# Patient Record
Sex: FEMALE | Race: BLACK OR AFRICAN AMERICAN | NOT HISPANIC OR LATINO | Employment: UNEMPLOYED | URBAN - METROPOLITAN AREA
[De-identification: names, ages, dates, MRNs, and addresses within clinical notes are randomized per-mention and may not be internally consistent; named-entity substitution may affect disease eponyms.]

---

## 2023-07-27 PROBLEM — R59.9 ENLARGED LYMPH NODE: Status: ACTIVE | Noted: 2023-07-27

## 2023-07-27 PROBLEM — H54.7 BLIND: Status: ACTIVE | Noted: 2023-07-27

## 2023-07-27 PROBLEM — R21 RASH: Status: ACTIVE | Noted: 2023-07-27

## 2023-07-27 PROBLEM — R62.50 DEVELOPMENTAL DELAY: Status: ACTIVE | Noted: 2023-07-27

## 2023-07-27 PROBLEM — J30.9 ALLERGIC RHINITIS: Status: ACTIVE | Noted: 2023-07-27

## 2023-07-27 PROBLEM — M54.9 BACK PAIN: Status: ACTIVE | Noted: 2023-07-27

## 2023-07-27 PROBLEM — H90.3 ASYMMETRICAL SENSORINEURAL HEARING LOSS: Status: ACTIVE | Noted: 2023-07-27

## 2023-08-18 ENCOUNTER — APPOINTMENT (OUTPATIENT)
Dept: PRIMARY CARE | Facility: CLINIC | Age: 38
End: 2023-08-18
Payer: MEDICAID

## 2023-12-15 ENCOUNTER — OFFICE VISIT (OUTPATIENT)
Dept: PRIMARY CARE | Facility: CLINIC | Age: 38
End: 2023-12-15
Payer: MEDICAID

## 2023-12-15 VITALS
WEIGHT: 116 LBS | BODY MASS INDEX: 33.97 KG/M2 | OXYGEN SATURATION: 99 % | RESPIRATION RATE: 16 BRPM | HEART RATE: 76 BPM | TEMPERATURE: 97.9 F | SYSTOLIC BLOOD PRESSURE: 128 MMHG | DIASTOLIC BLOOD PRESSURE: 70 MMHG

## 2023-12-15 DIAGNOSIS — N64.4 BREAST PAIN: Primary | ICD-10-CM

## 2023-12-15 DIAGNOSIS — K21.9 GASTROESOPHAGEAL REFLUX DISEASE WITHOUT ESOPHAGITIS: ICD-10-CM

## 2023-12-15 PROCEDURE — 99214 OFFICE O/P EST MOD 30 MIN: CPT | Performed by: INTERNAL MEDICINE

## 2023-12-15 RX ORDER — FLUTICASONE PROPIONATE 50 MCG
1 SPRAY, SUSPENSION (ML) NASAL DAILY
COMMUNITY
Start: 2019-10-24

## 2023-12-15 RX ORDER — PANTOPRAZOLE SODIUM 40 MG/1
40 TABLET, DELAYED RELEASE ORAL DAILY
Qty: 30 TABLET | Refills: 1 | Status: SHIPPED | OUTPATIENT
Start: 2023-12-15 | End: 2024-12-14

## 2023-12-15 ASSESSMENT — ENCOUNTER SYMPTOMS
BREAST PAIN: 1
ROS GI COMMENTS: POSITIVE FOR HEARTBURN.

## 2023-12-15 NOTE — PROGRESS NOTES
Patient here for indigestion issues. Discuss breast pain     Subjective   Patient ID: Linda Sánchez is a 38 y.o. female with Developmental Delay who presents for GI Problem and Breast Pain.  She is accompanied by her mother, who offers most of the history.    The patient reports onset of right-sided breast pain.  Her mother denies any family history of breast disease.     The patient notes occasional choking on bread, and this has improved since removing this food from her diet. This is associated with occasional heartburn.    GI Problem    Breast Pain  Associated Symptoms: breast pain      Review of Systems   Gastrointestinal:         Positive for heartburn.     Objective   Physical Exam  Constitutional:       Appearance: Normal appearance.   Neck:      Vascular: No carotid bruit.   Cardiovascular:      Rate and Rhythm: Normal rate and regular rhythm.      Heart sounds: Normal heart sounds.   Pulmonary:      Effort: Pulmonary effort is normal.      Breath sounds: Normal breath sounds.   Abdominal:      General: Bowel sounds are normal.      Palpations: Abdomen is soft.      Tenderness: There is abdominal tenderness. There is no guarding or rebound.   Skin:     General: Skin is warm and dry.   Neurological:      General: No focal deficit present.      Mental Status: She is alert and oriented to person, place, and time. Mental status is at baseline.   Psychiatric:         Mood and Affect: Mood normal.         Behavior: Behavior normal.       Assessment/Plan   Problem List Items Addressed This Visit    None  Visit Diagnoses         Codes    Breast pain    -  Primary N64.4    Relevant Orders    BI US breast complete bilateral    BI mammo bilateral diagnostic    Gastroesophageal reflux disease without esophagitis     K21.9    Relevant Medications    pantoprazole (ProtoNix) 40 mg EC tablet            IMPRESSION/PLAN:     Right-Sided Breast Pain  - Ordered Bilateral U/S breast, and Bilateral Diagnostic Mammogram.       Heartburn  - Prescribed pantoprazole 40mg once daily in the morning before breakfast.  Call the clinic if symptoms persist or worsen.    Woman's Wellness  - Encouraged patient to schedule follow-up with  from Obstetrics/Gynecology, and her mother agreed.    /70 in the office today.    Seasonal Allergies   - Refilled Fexofenadine 180 mg daily. She can continue with Flonase 2 sprays in each nostril once a day. Mother will call if symptoms worsen.      Health Maintenance   - Routine labs 2/2023.     Follow-up according to routine health maintenance, call sooner if needed.        Scribe Attestation  By signing my name below, I, Marilyn Amado   attest that this documentation has been prepared under the direction and in the presence of Rick Calvert DO.   Erum Rice 12/15/23 11:09 AM

## 2024-01-23 ENCOUNTER — HOSPITAL ENCOUNTER (OUTPATIENT)
Dept: RADIOLOGY | Facility: CLINIC | Age: 39
Discharge: HOME | End: 2024-01-23
Payer: MEDICAID

## 2024-01-23 ENCOUNTER — ANCILLARY PROCEDURE (OUTPATIENT)
Dept: RADIOLOGY | Facility: CLINIC | Age: 39
End: 2024-01-23
Payer: MEDICAID

## 2024-01-23 VITALS — HEIGHT: 55 IN | BODY MASS INDEX: 26.84 KG/M2 | WEIGHT: 115.96 LBS

## 2024-01-23 DIAGNOSIS — N64.4 BREAST PAIN: ICD-10-CM

## 2024-01-23 PROCEDURE — 76981 USE PARENCHYMA: CPT | Mod: RT

## 2024-01-23 PROCEDURE — 76642 ULTRASOUND BREAST LIMITED: CPT | Performed by: RADIOLOGY

## 2024-01-23 PROCEDURE — 76642 ULTRASOUND BREAST LIMITED: CPT | Mod: RT,59

## 2024-01-23 PROCEDURE — 77066 DX MAMMO INCL CAD BI: CPT | Performed by: RADIOLOGY

## 2024-01-23 PROCEDURE — 77066 DX MAMMO INCL CAD BI: CPT

## 2024-12-30 ENCOUNTER — OFFICE VISIT (OUTPATIENT)
Dept: URGENT CARE | Age: 39
End: 2024-12-30
Payer: MEDICAID

## 2024-12-30 VITALS
DIASTOLIC BLOOD PRESSURE: 87 MMHG | HEART RATE: 78 BPM | SYSTOLIC BLOOD PRESSURE: 129 MMHG | OXYGEN SATURATION: 99 % | TEMPERATURE: 98.1 F | RESPIRATION RATE: 16 BRPM

## 2024-12-30 DIAGNOSIS — J06.9 URI, ACUTE: Primary | ICD-10-CM

## 2024-12-30 PROCEDURE — 1036F TOBACCO NON-USER: CPT | Performed by: PHYSICIAN ASSISTANT

## 2024-12-30 PROCEDURE — 99203 OFFICE O/P NEW LOW 30 MIN: CPT | Performed by: PHYSICIAN ASSISTANT

## 2024-12-30 RX ORDER — BENZONATATE 200 MG/1
200 CAPSULE ORAL 3 TIMES DAILY PRN
Qty: 21 CAPSULE | Refills: 0 | Status: SHIPPED | OUTPATIENT
Start: 2024-12-30 | End: 2025-01-06

## 2024-12-30 RX ORDER — AZITHROMYCIN 250 MG/1
TABLET, FILM COATED ORAL
Qty: 6 TABLET | Refills: 0 | Status: SHIPPED | OUTPATIENT
Start: 2024-12-30

## 2024-12-30 NOTE — PATIENT INSTRUCTIONS
Warm liquids with honey  Increase fluid intake; encourage electrolytes such as Pedialyte  10 deep breaths an hour  May use tylenol/NSAIDs as needed for fevers, chills, body aches.   Monitor symptoms over the next 3-5 days if not improving or worsening start antibiotic

## 2024-12-30 NOTE — PROGRESS NOTES
Subjective   Patient ID: Linda Sánchez is a 39 y.o. female. They present today with a chief complaint of uri  History of Present Illness  HPI  39-year-old patient  with history of developmental delay presents to clinic accompanied by patient's mother and caretaker with complaints of cough with associated rhinorrhea, nasal congestion, intermittent shortness of breath, sore throat ongoing for the past 6 to 7 days.  Reports no treatments tried. Denies chest pain, dizziness, fevers, chills, body aches, nausea, vomiting, abdominal pain, diarrhea.     Past Medical History  Allergies as of 12/30/2024 - Reviewed 12/15/2023   Allergen Reaction Noted    Penicillins Other and Swelling 09/14/2018    Animal dander Other 12/15/2023    House dust Other 12/15/2023       (Not in a hospital admission)       Past Medical History:   Diagnosis Date    Other conditions influencing health status     No significant past medical history    Personal history of other diseases of the digestive system     History of gastroesophageal reflux (GERD)    Unspecified hearing loss, left ear     Hearing loss in left ear    Unspecified lack of expected normal physiological development in childhood     Developmental delay       Past Surgical History:   Procedure Laterality Date    OTHER SURGICAL HISTORY  03/12/2019    Foot surgery    OTHER SURGICAL HISTORY  11/15/2018    Blepharoplasty            Review of Systems  Review of Systems  ROS negative with the exception as noted on HPI                              Objective    There were no vitals filed for this visit.  No LMP recorded. Patient is postmenopausal.    Physical Exam  Constitutional:       Appearance: Normal appearance.   HENT:      Head: Normocephalic and atraumatic.      Right Ear: Ear canal and external ear normal. A middle ear effusion is present.      Left Ear: Ear canal and external ear normal. A middle ear effusion is present. There is impacted cerumen.      Nose: Mucosal edema (and  erythema) present. No rhinorrhea.      Right Sinus: No maxillary sinus tenderness or frontal sinus tenderness.      Left Sinus: No maxillary sinus tenderness or frontal sinus tenderness.      Mouth/Throat:      Lips: Pink.      Mouth: Mucous membranes are moist. No oral lesions.      Dentition: Normal dentition. No gingival swelling.      Tongue: No lesions. Tongue does not deviate from midline.      Palate: No mass and lesions.      Pharynx: Posterior oropharyngeal erythema and postnasal drip present. No pharyngeal swelling or uvula swelling.   Cardiovascular:      Rate and Rhythm: Normal rate and regular rhythm.      Heart sounds: No murmur heard.  Pulmonary:      Effort: Pulmonary effort is normal. No respiratory distress.      Breath sounds: Normal breath sounds. No stridor. No wheezing, rhonchi or rales.   Lymphadenopathy:      Cervical: Cervical adenopathy present.   Neurological:      Mental Status: She is alert.         Procedures    Point of Care Test & Imaging Results from this visit  No results found for this visit on 12/30/24.   No results found.    Diagnostic study results (if any) were reviewed by Kandi Padilla PA-C.    Assessment/Plan   Allergies, medications, history, and pertinent labs/EKGs/Imaging reviewed by Kandi Padilla PA-C.   cough with associated rhinorrhea, nasal congestion, intermittent shortness of breath, sore throat ongoing for the past 6 to 7 days.   Mother who is also being evaluated in clinic tested positive for COVID-19.  Discussed with parent and patient likely patient has COVID-19 as well but at this time no longer treatable.  Will start Tessalon Perles for symptomatic relief.  Z-Earle ordered in the event patient develops worsening symptoms or does not improve in the next 3 to 5 days may start oral antibiotic for potential secondary bacterial infection.  Antibiotic stewardship and watchful waiting started. Advised to drink plenty of fluids, run a cool-mist humidifier  in room at night, and get plenty of rest. Pt. is advised to take 10 deep breaths and hours and continue to walk around every couple of hours. Patient should avoid over-exertion and reduce exposure to irritants such as smoke, cold, dry air, and dust. Patient may take acetaminophen or ibuprofen as directed to reduce fever and body aches. Risk, benefits, and potential side effects of medication(s) discussed with pt. And parent. Discussed disease/illness presentation, treatment options, progression, complications, and outcomes with patient. Pt. And parent Have expressed understanding and are in agreement of plan of care.       Medical Decision Making      Orders and Diagnoses  There are no diagnoses linked to this encounter.    Medical Admin Record      Patient disposition: Home    Electronically signed by Kandi Padilla PA-C  5:09 PM

## 2025-01-16 ENCOUNTER — APPOINTMENT (OUTPATIENT)
Dept: PRIMARY CARE | Facility: CLINIC | Age: 40
End: 2025-01-16
Payer: MEDICAID

## 2025-01-16 ENCOUNTER — LAB (OUTPATIENT)
Dept: LAB | Facility: LAB | Age: 40
End: 2025-01-16
Payer: MEDICAID

## 2025-01-16 VITALS
RESPIRATION RATE: 16 BRPM | WEIGHT: 116 LBS | BODY MASS INDEX: 33.95 KG/M2 | TEMPERATURE: 97.8 F | OXYGEN SATURATION: 97 % | DIASTOLIC BLOOD PRESSURE: 70 MMHG | SYSTOLIC BLOOD PRESSURE: 122 MMHG | HEART RATE: 85 BPM

## 2025-01-16 DIAGNOSIS — H61.20 IMPACTED CERUMEN, UNSPECIFIED LATERALITY: Primary | ICD-10-CM

## 2025-01-16 DIAGNOSIS — Z00.00 HEALTHCARE MAINTENANCE: ICD-10-CM

## 2025-01-16 PROCEDURE — 80053 COMPREHEN METABOLIC PANEL: CPT

## 2025-01-16 PROCEDURE — 85027 COMPLETE CBC AUTOMATED: CPT

## 2025-01-16 PROCEDURE — 85007 BL SMEAR W/DIFF WBC COUNT: CPT

## 2025-01-16 PROCEDURE — 84443 ASSAY THYROID STIM HORMONE: CPT

## 2025-01-16 PROCEDURE — 99214 OFFICE O/P EST MOD 30 MIN: CPT | Performed by: INTERNAL MEDICINE

## 2025-01-16 PROCEDURE — 1036F TOBACCO NON-USER: CPT | Performed by: INTERNAL MEDICINE

## 2025-01-16 ASSESSMENT — ENCOUNTER SYMPTOMS
ABDOMINAL PAIN: 0
CONSTIPATION: 0
DIARRHEA: 0
COUGH: 0

## 2025-01-16 ASSESSMENT — PATIENT HEALTH QUESTIONNAIRE - PHQ9
1. LITTLE INTEREST OR PLEASURE IN DOING THINGS: NOT AT ALL
SUM OF ALL RESPONSES TO PHQ9 QUESTIONS 1 AND 2: 0
2. FEELING DOWN, DEPRESSED OR HOPELESS: NOT AT ALL

## 2025-01-16 NOTE — PROGRESS NOTES
Patient here for follow up from COVID     Subjective   Patient ID: Linda Sánchez is a 39 y.o. female who presents for Follow-up.  She is accompanied by her mother, who offers some of the history.    The patient has been eating food quickly without chewing properly, resulting in indigestion.  Her mother suspects that this may be in an effort to get to work on time.  The patient denies any abdominal pain or bowel problems.    The patient is recovering from a recent episode of suspected Covid-19.  She states that the cough has resolved.     The patient mentions ear fullness, and her mother requests a referral to Otolaryngology for ear irrigation.      Review of Systems   HENT:          Positive for ear fullness.   Respiratory:  Negative for cough.    Gastrointestinal:  Negative for abdominal pain, constipation and diarrhea.     Objective   Physical Exam  Constitutional:       Appearance: Normal appearance.   HENT:      Right Ear: There is impacted cerumen.      Left Ear: There is impacted cerumen.   Neck:      Vascular: No carotid bruit.   Cardiovascular:      Rate and Rhythm: Normal rate and regular rhythm.      Heart sounds: Normal heart sounds.   Pulmonary:      Effort: Pulmonary effort is normal.      Breath sounds: Normal breath sounds.   Abdominal:      General: Bowel sounds are normal.      Palpations: Abdomen is soft.      Tenderness: There is no abdominal tenderness.   Skin:     General: Skin is warm and dry.   Neurological:      General: No focal deficit present.      Mental Status: She is alert and oriented to person, place, and time. Mental status is at baseline.   Psychiatric:         Mood and Affect: Mood normal.         Behavior: Behavior normal.       Assessment/Plan   Problem List Items Addressed This Visit    None  Visit Diagnoses         Codes    Impacted cerumen, unspecified laterality    -  Primary H61.20    Relevant Orders    Referral to ENT    Healthcare maintenance     Z00.00    Relevant Orders     Urinalysis with Reflex Microscopic    Urine Culture    CBC and Auto Differential    Comprehensive metabolic panel    Tsh With Reflex To Free T4 If Abnormal            IMPRESSION/PLAN:      Impacted Cerumen  - Prescribed Debrox ear drops to be taken as directed on packaging.  Ordered referral to Otolaryngology with .    /70 in the office today.     Seasonal Allergies   - Refilled Fexofenadine 180 mg daily. She can continue with Flonase 2 sprays in each nostril once a day. Mother will call if symptoms worsen.      Right-Sided Breast Pain  - Bilateral U/S breast, and Bilateral Diagnostic Mammogram 1/2024 both unremarkable.     Heartburn  - Prescribed pantoprazole 40mg once daily in the morning before breakfast.  Call the clinic if symptoms persist or worsen.    Woman's Wellness  - Encouraged patient to schedule follow-up with  from Obstetrics/Gynecology, and her mother agreed.    Health Maintenance   - Routine labs ordered including CBC, CMP to be completed in the fasting state. Added TSH, Urinalysis with reflex microscopy, and Urine Culture.      Follow-up according to routine health maintenance, call sooner if needed.        Scribe Attestation  By signing my name below, I, Lalitha Rice, Marilyn   attest that this documentation has been prepared under the direction and in the presence of Rick Calvert DO.   Erum Rice 01/16/25 1:32 PM

## 2025-01-17 LAB
ALBUMIN SERPL BCP-MCNC: 4.3 G/DL (ref 3.4–5)
ALP SERPL-CCNC: 50 U/L (ref 33–110)
ALT SERPL W P-5'-P-CCNC: 16 U/L (ref 7–45)
ANION GAP SERPL CALC-SCNC: 15 MMOL/L (ref 10–20)
AST SERPL W P-5'-P-CCNC: 20 U/L (ref 9–39)
BASOPHILS # BLD MANUAL: 0 X10*3/UL (ref 0–0.1)
BASOPHILS NFR BLD MANUAL: 0 %
BILIRUB SERPL-MCNC: 0.4 MG/DL (ref 0–1.2)
BUN SERPL-MCNC: 10 MG/DL (ref 6–23)
CALCIUM SERPL-MCNC: 9.5 MG/DL (ref 8.6–10.6)
CHLORIDE SERPL-SCNC: 100 MMOL/L (ref 98–107)
CO2 SERPL-SCNC: 28 MMOL/L (ref 21–32)
CREAT SERPL-MCNC: 0.52 MG/DL (ref 0.5–1.05)
EGFRCR SERPLBLD CKD-EPI 2021: >90 ML/MIN/1.73M*2
EOSINOPHIL # BLD MANUAL: 0.21 X10*3/UL (ref 0–0.7)
EOSINOPHIL NFR BLD MANUAL: 2.5 %
ERYTHROCYTE [DISTWIDTH] IN BLOOD BY AUTOMATED COUNT: 12.9 % (ref 11.5–14.5)
GLUCOSE SERPL-MCNC: 81 MG/DL (ref 74–99)
HCT VFR BLD AUTO: 42.4 % (ref 36–46)
HGB BLD-MCNC: 13.2 G/DL (ref 12–16)
IMM GRANULOCYTES # BLD AUTO: 0.02 X10*3/UL (ref 0–0.7)
IMM GRANULOCYTES NFR BLD AUTO: 0.2 % (ref 0–0.9)
LYMPHOCYTES # BLD MANUAL: 1.76 X10*3/UL (ref 1.2–4.8)
LYMPHOCYTES NFR BLD MANUAL: 21.2 %
MCH RBC QN AUTO: 25.4 PG (ref 26–34)
MCHC RBC AUTO-ENTMCNC: 31.1 G/DL (ref 32–36)
MCV RBC AUTO: 82 FL (ref 80–100)
MONOCYTES # BLD MANUAL: 0.71 X10*3/UL (ref 0.1–1)
MONOCYTES NFR BLD MANUAL: 8.5 %
NEUTS SEG # BLD MANUAL: 4.78 X10*3/UL (ref 1.2–7)
NEUTS SEG NFR BLD MANUAL: 57.6 %
NRBC BLD-RTO: 0 /100 WBCS (ref 0–0)
PLATELET # BLD AUTO: 421 X10*3/UL (ref 150–450)
POTASSIUM SERPL-SCNC: 4.6 MMOL/L (ref 3.5–5.3)
PROT SERPL-MCNC: 7.7 G/DL (ref 6.4–8.2)
RBC # BLD AUTO: 5.19 X10*6/UL (ref 4–5.2)
RBC MORPH BLD: ABNORMAL
SODIUM SERPL-SCNC: 138 MMOL/L (ref 136–145)
TOTAL CELLS COUNTED BLD: 118
TSH SERPL-ACNC: 1.14 MIU/L (ref 0.44–3.98)
VARIANT LYMPHS # BLD MANUAL: 0.85 X10*3/UL (ref 0–0.5)
VARIANT LYMPHS NFR BLD: 10.2 %
WBC # BLD AUTO: 8.3 X10*3/UL (ref 4.4–11.3)

## 2025-02-10 ENCOUNTER — TELEPHONE (OUTPATIENT)
Dept: PRIMARY CARE | Facility: CLINIC | Age: 40
End: 2025-02-10
Payer: MEDICAID

## 2025-02-10 NOTE — TELEPHONE ENCOUNTER
Message was received that pt needs to repeat labs, please advise if pt needs a follow up appointment?

## 2025-02-18 LAB
APPEARANCE UR: CLEAR
BASOPHILS # BLD AUTO: 38 CELLS/UL (ref 0–200)
BASOPHILS NFR BLD AUTO: 0.4 %
BILIRUB UR QL STRIP: NEGATIVE
COLOR UR: YELLOW
EOSINOPHIL # BLD AUTO: 133 CELLS/UL (ref 15–500)
EOSINOPHIL NFR BLD AUTO: 1.4 %
ERYTHROCYTE [DISTWIDTH] IN BLOOD BY AUTOMATED COUNT: 13.6 % (ref 11–15)
GLUCOSE UR QL STRIP: NEGATIVE
HCT VFR BLD AUTO: 38.7 % (ref 35–45)
HGB BLD-MCNC: 12.4 G/DL (ref 11.7–15.5)
HGB UR QL STRIP: NEGATIVE
KETONES UR QL STRIP: ABNORMAL
LEUKOCYTE ESTERASE UR QL STRIP: NEGATIVE
LYMPHOCYTES # BLD AUTO: 1900 CELLS/UL (ref 850–3900)
LYMPHOCYTES NFR BLD AUTO: 20 %
MCH RBC QN AUTO: 26.3 PG (ref 27–33)
MCHC RBC AUTO-ENTMCNC: 32 G/DL (ref 32–36)
MCV RBC AUTO: 82 FL (ref 80–100)
MONOCYTES # BLD AUTO: 542 CELLS/UL (ref 200–950)
MONOCYTES NFR BLD AUTO: 5.7 %
NEUTROPHILS # BLD AUTO: 6888 CELLS/UL (ref 1500–7800)
NEUTROPHILS NFR BLD AUTO: 72.5 %
NITRITE UR QL STRIP: NEGATIVE
PH UR STRIP: 5.5 [PH] (ref 5–8)
PLATELET # BLD AUTO: 362 THOUSAND/UL (ref 140–400)
PMV BLD REES-ECKER: 9.4 FL (ref 7.5–12.5)
PROT UR QL STRIP: NEGATIVE
RBC # BLD AUTO: 4.72 MILLION/UL (ref 3.8–5.1)
SP GR UR STRIP: 1.03 (ref 1–1.03)
WBC # BLD AUTO: 9.5 THOUSAND/UL (ref 3.8–10.8)

## 2025-03-24 ENCOUNTER — APPOINTMENT (OUTPATIENT)
Facility: CLINIC | Age: 40
End: 2025-03-24
Payer: MEDICAID

## 2025-03-24 ENCOUNTER — OFFICE VISIT (OUTPATIENT)
Facility: CLINIC | Age: 40
End: 2025-03-24
Payer: MEDICAID

## 2025-03-24 VITALS
HEIGHT: 59 IN | WEIGHT: 116 LBS | SYSTOLIC BLOOD PRESSURE: 121 MMHG | BODY MASS INDEX: 23.39 KG/M2 | DIASTOLIC BLOOD PRESSURE: 83 MMHG

## 2025-03-24 DIAGNOSIS — H93.8X3 SENSATION OF PLUGGED EAR, BILATERAL: ICD-10-CM

## 2025-03-24 DIAGNOSIS — H61.23 BILATERAL IMPACTED CERUMEN: Primary | ICD-10-CM

## 2025-03-24 PROCEDURE — 1036F TOBACCO NON-USER: CPT

## 2025-03-24 PROCEDURE — 69210 REMOVE IMPACTED EAR WAX UNI: CPT

## 2025-03-24 PROCEDURE — 3008F BODY MASS INDEX DOCD: CPT

## 2025-03-24 PROCEDURE — 99203 OFFICE O/P NEW LOW 30 MIN: CPT

## 2025-03-24 NOTE — PROGRESS NOTES
Patient ID: Linda Sánchez is a 39 y.o. female who presents for ear evaluation and for earwax removal. Patient accompanied to this visit by her mother.      PROVIDER IMPRESSIONS:  DIAGNOSES/PROBLEMS:  - Cerumen impaction of both ears   - a plugged sensation in both ears     ASSESSMENT: Linda Sánchez is a 39 y.o. female with a history of ASNHL who presents for an initial encounter with symptoms and clinical findings that are consistent with bilateral cerumen impaction. Using appropriate instrumentation, impacted cerumen was successfully removed from the EAC on the right. Unfortunately, due to the degree of impaction and patient discomfort, cerumen was unable to be completely removed from the left EAC today. Otologic exam today under microscopy revealed no evidence of EAC infection/inflammation on the right and no evidence of infection, effusion, retraction or perforation of the right TM.      PLAN:   -I counseled patient on safe interventions for cerumen management at home. Patient may wash the external ear with a cloth. I reinforced education to the patient that they should avoid using cotton tipped applicators, tissues, paper, or any rigid object in the ear canal. I explained to the patient that doing so may cause wax to be pushed back into the ear canal and stuck and also risks injury to the ear canal and ear drum.   -I recommended instillation of o.t.c. Debrox ceruminolytic ear drops into the left EAC 3x/week until follow-up. Patient was counseled on the indications, possible side effects, and proper administration of medication.  -Follow-up: Patient may schedule for routine evaluation for the removal of left cerumen impaction in 2-3 weeks, sooner as needed. Patient is agreeable to plan. All questions answered to patient satisfaction.    Subjective    HPI: Linda Sánchez is a 39 y.o. female who presents for an initial evaluation for the ears and earwax removal. Patient presents today with her caregivers who  assist with history intake. Patient last seen by Leyla Christiansen CNP in 2020 for the removal of impacted cerumen.  Patient has not been using ceruminolytic agents/drops to loosen wax immediately prior to this visit. Denies a past medical history of recurrent ear infections. Denies any prior history of ear surgery. Denies history of PE tube insertion. Denies history of prolonged/traumatic loud noise exposure.     REVIEW OF SYSTEMS:  All other systems negative.    Objective   Physical Exam:  Right Ear: External inspection of ear with no deformity, scars, or masses. EAC is completely impacted with cerumen. Unable to visualize tympanic membrane.  Left Ear: External inspection of ear with no deformity, scars, or masses. EAC is completely impacted with cerumen. Unable to visualize tympanic membrane.     Nose: External inspection of nose: No nasal lesions, lacerations or scars.      Neurologic: Cranial nerves II-XII grossly intact and symmetric bilaterally.  Head and Face:  Head: Atraumatic with no masses, lesions or scarring.  Face: Normal symmetry. No scars or deformities.  Eyes: Conjunctiva not edematous or erythematous.   Neck: Normal appearing, symmetric, trachea midline.   Cardiovascular: Examination of peripheral vascular system shows no clubbing or cyanosis.  Respiratory: No respiratory distress increased work of breathing. Inspection of the chest with symmetric chest expansion and normal respiratory effort.  Skin: No head and neck rashes.  Lymph nodes: No adenopathy.     EAR CLEANING PROCEDURE NOTE:  Indication: Cerumen impaction  Location: bilateral ear canal(s)  Procedure Note: The procedure was performed by the provider.  Visualization Instrument: A microscope with a #4 speculum was placed in the ear canals to visualize the ear canal debris.  Ear Cleaning Instrument and Outcome: Using the suction, hook, alligator forceps, a large amount of dry, brown cerumen was removed from the impacted EAC on the right.    Patient Status: The patient tolerated the procedure well.  Complications: Unfortunately, due to the degree of impaction and patient discomfort, impacted cerumen was unable to be removed from the left EAC.  Post-Procedure/Microscopic Otologic Exam:  Right Ear--EAC is clear. TM is intact with no sign of infection, effusion, or retraction.  No perforation seen. Auto insufflation visible under microscopy.  Left Ear-- EAC is impacted with cerumen. Unable to visualize TM.

## 2025-04-17 ENCOUNTER — APPOINTMENT (OUTPATIENT)
Facility: CLINIC | Age: 40
End: 2025-04-17
Payer: MEDICAID

## 2025-04-23 ENCOUNTER — APPOINTMENT (OUTPATIENT)
Facility: CLINIC | Age: 40
End: 2025-04-23
Payer: MEDICAID

## 2025-07-17 ENCOUNTER — APPOINTMENT (OUTPATIENT)
Dept: PRIMARY CARE | Facility: CLINIC | Age: 40
End: 2025-07-17
Payer: MEDICAID

## 2025-07-17 VITALS
BODY MASS INDEX: 23.59 KG/M2 | SYSTOLIC BLOOD PRESSURE: 100 MMHG | OXYGEN SATURATION: 98 % | WEIGHT: 117 LBS | HEART RATE: 72 BPM | HEIGHT: 59 IN | DIASTOLIC BLOOD PRESSURE: 60 MMHG

## 2025-07-17 DIAGNOSIS — R62.50 DEVELOPMENTAL DELAY: ICD-10-CM

## 2025-07-17 DIAGNOSIS — Z12.31 ENCOUNTER FOR SCREENING MAMMOGRAM FOR MALIGNANT NEOPLASM OF BREAST: Primary | ICD-10-CM

## 2025-07-17 DIAGNOSIS — R59.9 ENLARGED LYMPH NODE: ICD-10-CM

## 2025-07-17 PROCEDURE — 99214 OFFICE O/P EST MOD 30 MIN: CPT | Performed by: INTERNAL MEDICINE

## 2025-07-17 PROCEDURE — 1036F TOBACCO NON-USER: CPT | Performed by: INTERNAL MEDICINE

## 2025-07-17 PROCEDURE — 3008F BODY MASS INDEX DOCD: CPT | Performed by: INTERNAL MEDICINE

## 2025-07-17 ASSESSMENT — PATIENT HEALTH QUESTIONNAIRE - PHQ9
2. FEELING DOWN, DEPRESSED OR HOPELESS: NOT AT ALL
1. LITTLE INTEREST OR PLEASURE IN DOING THINGS: NOT AT ALL
SUM OF ALL RESPONSES TO PHQ9 QUESTIONS 1 AND 2: 0

## 2025-07-17 ASSESSMENT — ENCOUNTER SYMPTOMS
APPETITE CHANGE: 0
CONSTIPATION: 0
SHORTNESS OF BREATH: 0
ABDOMINAL PAIN: 0
DIARRHEA: 0

## 2025-07-17 NOTE — PROGRESS NOTES
Subjective   Patient ID: Linda Sánchez is a 39 y.o. female who presents for Follow-up (She is here for her 6 month follow up).  She is accompanied by her mother, who offers some of the history.    The patient has noticed that she is sitting awkwardly and suspects that she may have pain in the gluteal region.     The patient denies any dyspnea, reduced appetite, abdominal pain, or bowel problems.       Review of Systems   Constitutional:  Negative for appetite change.   Respiratory:  Negative for shortness of breath.    Gastrointestinal:  Negative for abdominal pain, constipation and diarrhea.   Skin:         Pain in gluteal area.   All other systems reviewed and are negative.      Objective   Physical Exam  Constitutional:       Appearance: Normal appearance.   Neck:      Vascular: No carotid bruit.     Cardiovascular:      Rate and Rhythm: Normal rate and regular rhythm.      Heart sounds: Normal heart sounds.   Pulmonary:      Effort: Pulmonary effort is normal.      Breath sounds: Normal breath sounds.   Abdominal:      General: Bowel sounds are normal.      Palpations: Abdomen is soft.      Tenderness: There is no abdominal tenderness.     Skin:     General: Skin is warm and dry.     Neurological:      General: No focal deficit present.      Mental Status: She is alert and oriented to person, place, and time. Mental status is at baseline.     Psychiatric:         Mood and Affect: Mood normal.         Behavior: Behavior normal.       Assessment/Plan   Problem List Items Addressed This Visit           ICD-10-CM    Enlarged lymph node R59.9    Relevant Orders    CBC and Auto Differential    Comprehensive metabolic panel    Lipid panel    Tsh With Reflex To Free T4 If Abnormal     Other Visit Diagnoses         Codes      Encounter for screening mammogram for malignant neoplasm of breast    -  Primary Z12.31    Relevant Orders    BI mammo bilateral screening tomosynthesis            IMPRESSION/PLAN:     Gluteal  "Cleft Pain  - Mild erythema at the top of the top of the gluteal cleft.  No signs of skin breakdown.  Advised patient apply Desitin cream to the area twice daily.  Call the clinic if symptoms persist or worsen.     /60 in the office today.    Seasonal Allergies   - Refilled Fexofenadine 180 mg daily. She can continue with Flonase 2 sprays in each nostril once a day. Mother will call if symptoms worsen.      Impacted Cerumen  - Prescribed Debrox ear drops to be taken as directed on packaging.  Ordered referral to Otolaryngology with .    Right-Sided Breast Pain  - Bilateral U/S breast, and Bilateral Diagnostic Mammogram 1/2024 both unremarkable.      Heartburn  - Prescribed pantoprazole 40mg once daily in the morning before breakfast.  Call the clinic if symptoms persist or worsen.     Woman's Wellness  - Encouraged patient to schedule follow-up with  from Obstetrics/Gynecology, and her mother agreed.     Health Maintenance   - Routine labs ordered including CBC, CMP, and a lipid panel to be completed in the fasting state.  Added TSH.  Last Pap 11/2018.  Last Mammogram 1/2024, ordered repeat for 2025.  Last Tdap 6/5/2008.     Follow-up according to routine health maintenance, call sooner if needed.        \"I, Dr. Calvert, personally performed the services described in the documentation as scribed by Erum Rice in my presence, and confirm it is both accurate and complete.   Scribe Attestation     Scribe Attestation  By signing my name below, I, Erum Rice, Scribe   attest that this documentation has been prepared under the direction and in the presence of Rick Calvert DO.   Erum Rice 07/17/25 1:16 PM   "

## 2025-08-02 LAB
ALBUMIN SERPL-MCNC: 4.6 G/DL (ref 3.6–5.1)
ALP SERPL-CCNC: 48 U/L (ref 31–125)
ALT SERPL-CCNC: 12 U/L (ref 6–29)
ANION GAP SERPL CALCULATED.4IONS-SCNC: 13 MMOL/L (CALC) (ref 7–17)
APPEARANCE UR: CLEAR
AST SERPL-CCNC: 15 U/L (ref 10–30)
BASOPHILS # BLD AUTO: 43 CELLS/UL (ref 0–200)
BASOPHILS NFR BLD AUTO: 0.7 %
BILIRUB SERPL-MCNC: 0.6 MG/DL (ref 0.2–1.2)
BILIRUB UR QL STRIP: NEGATIVE
BUN SERPL-MCNC: 8 MG/DL (ref 7–25)
CALCIUM SERPL-MCNC: 9.7 MG/DL (ref 8.6–10.2)
CHLORIDE SERPL-SCNC: 103 MMOL/L (ref 98–110)
CHOLEST SERPL-MCNC: 201 MG/DL
CHOLEST/HDLC SERPL: 2.5 (CALC)
CO2 SERPL-SCNC: 24 MMOL/L (ref 20–32)
COLOR UR: YELLOW
CREAT SERPL-MCNC: 0.66 MG/DL (ref 0.5–0.99)
EGFRCR SERPLBLD CKD-EPI 2021: 114 ML/MIN/1.73M2
EOSINOPHIL # BLD AUTO: 171 CELLS/UL (ref 15–500)
EOSINOPHIL NFR BLD AUTO: 2.8 %
ERYTHROCYTE [DISTWIDTH] IN BLOOD BY AUTOMATED COUNT: 13.4 % (ref 11–15)
GLUCOSE SERPL-MCNC: 85 MG/DL (ref 65–99)
GLUCOSE UR QL STRIP: NEGATIVE
HCT VFR BLD AUTO: 42.5 % (ref 35–45)
HDLC SERPL-MCNC: 80 MG/DL
HGB BLD-MCNC: 13.1 G/DL (ref 11.7–15.5)
HGB UR QL STRIP: NEGATIVE
KETONES UR QL STRIP: NEGATIVE
LDLC SERPL CALC-MCNC: 106 MG/DL (CALC)
LEUKOCYTE ESTERASE UR QL STRIP: NEGATIVE
LYMPHOCYTES # BLD AUTO: 1830 CELLS/UL (ref 850–3900)
LYMPHOCYTES NFR BLD AUTO: 30 %
MCH RBC QN AUTO: 26 PG (ref 27–33)
MCHC RBC AUTO-ENTMCNC: 30.8 G/DL (ref 32–36)
MCV RBC AUTO: 84.5 FL (ref 80–100)
MONOCYTES # BLD AUTO: 342 CELLS/UL (ref 200–950)
MONOCYTES NFR BLD AUTO: 5.6 %
NEUTROPHILS # BLD AUTO: 3715 CELLS/UL (ref 1500–7800)
NEUTROPHILS NFR BLD AUTO: 60.9 %
NITRITE UR QL STRIP: NEGATIVE
NONHDLC SERPL-MCNC: 121 MG/DL (CALC)
PH UR STRIP: 6 [PH] (ref 5–8)
PLATELET # BLD AUTO: 362 THOUSAND/UL (ref 140–400)
PMV BLD REES-ECKER: 9.2 FL (ref 7.5–12.5)
POTASSIUM SERPL-SCNC: 4.5 MMOL/L (ref 3.5–5.3)
PROT SERPL-MCNC: 7.7 G/DL (ref 6.1–8.1)
PROT UR QL STRIP: NEGATIVE
RBC # BLD AUTO: 5.03 MILLION/UL (ref 3.8–5.1)
SODIUM SERPL-SCNC: 140 MMOL/L (ref 135–146)
SP GR UR STRIP: 1.02 (ref 1–1.03)
TRIGL SERPL-MCNC: 67 MG/DL
TSH SERPL-ACNC: 1.37 MIU/L
WBC # BLD AUTO: 6.1 THOUSAND/UL (ref 3.8–10.8)

## 2026-01-20 ENCOUNTER — APPOINTMENT (OUTPATIENT)
Dept: PRIMARY CARE | Facility: CLINIC | Age: 41
End: 2026-01-20
Payer: MEDICAID